# Patient Record
Sex: FEMALE | Race: WHITE | NOT HISPANIC OR LATINO
[De-identification: names, ages, dates, MRNs, and addresses within clinical notes are randomized per-mention and may not be internally consistent; named-entity substitution may affect disease eponyms.]

---

## 2019-01-02 PROBLEM — Z00.00 ENCOUNTER FOR PREVENTIVE HEALTH EXAMINATION: Status: ACTIVE | Noted: 2019-01-02

## 2022-08-15 ENCOUNTER — APPOINTMENT (OUTPATIENT)
Dept: ORTHOPEDIC SURGERY | Facility: CLINIC | Age: 52
End: 2022-08-15

## 2022-08-15 VITALS — HEIGHT: 68 IN | BODY MASS INDEX: 22.73 KG/M2 | WEIGHT: 150 LBS

## 2022-08-15 DIAGNOSIS — Z78.9 OTHER SPECIFIED HEALTH STATUS: ICD-10-CM

## 2022-08-15 DIAGNOSIS — F17.200 NICOTINE DEPENDENCE, UNSPECIFIED, UNCOMPLICATED: ICD-10-CM

## 2022-08-15 PROCEDURE — 99214 OFFICE O/P EST MOD 30 MIN: CPT

## 2022-08-15 RX ORDER — KETOROLAC TROMETHAMINE 10 MG/1
10 TABLET, FILM COATED ORAL
Qty: 10 | Refills: 0 | Status: ACTIVE | COMMUNITY
Start: 2022-04-07

## 2022-08-15 RX ORDER — TOPIRAMATE 100 MG/1
100 TABLET, FILM COATED ORAL
Qty: 30 | Refills: 0 | Status: ACTIVE | COMMUNITY
Start: 2022-08-03

## 2022-08-15 RX ORDER — CLONAZEPAM 1 MG/1
1 TABLET ORAL
Qty: 90 | Refills: 0 | Status: ACTIVE | COMMUNITY
Start: 2022-07-05

## 2022-08-15 RX ORDER — FOLIC ACID 1 MG/1
1 TABLET ORAL
Qty: 30 | Refills: 0 | Status: ACTIVE | COMMUNITY
Start: 2022-02-21

## 2022-08-15 RX ORDER — GABAPENTIN 800 MG/1
800 TABLET, COATED ORAL
Qty: 90 | Refills: 0 | Status: ACTIVE | COMMUNITY
Start: 2022-08-03

## 2022-08-15 RX ORDER — PREGABALIN 100 MG/1
100 CAPSULE ORAL
Qty: 90 | Refills: 0 | Status: ACTIVE | COMMUNITY
Start: 2022-08-03

## 2022-08-15 RX ORDER — AMOXICILLIN AND CLAVULANATE POTASSIUM 875; 125 MG/1; MG/1
875-125 TABLET, COATED ORAL
Qty: 20 | Refills: 0 | Status: ACTIVE | COMMUNITY
Start: 2022-04-19

## 2022-08-15 RX ORDER — CITALOPRAM HYDROBROMIDE 40 MG/1
40 TABLET, FILM COATED ORAL
Qty: 30 | Refills: 0 | Status: ACTIVE | COMMUNITY
Start: 2022-08-03

## 2022-08-15 RX ORDER — MORPHINE SULFATE 15 MG/1
15 TABLET, FILM COATED, EXTENDED RELEASE ORAL
Qty: 60 | Refills: 0 | Status: ACTIVE | COMMUNITY
Start: 2022-07-05

## 2022-08-15 RX ORDER — MONTELUKAST 10 MG/1
10 TABLET, FILM COATED ORAL
Qty: 30 | Refills: 0 | Status: ACTIVE | COMMUNITY
Start: 2022-04-28

## 2022-08-15 RX ORDER — TRAZODONE HYDROCHLORIDE 150 MG/1
150 TABLET ORAL
Qty: 30 | Refills: 0 | Status: ACTIVE | COMMUNITY
Start: 2022-08-03

## 2022-08-15 RX ORDER — DICLOFENAC SODIUM 1% 10 MG/G
1 GEL TOPICAL
Qty: 100 | Refills: 0 | Status: ACTIVE | COMMUNITY
Start: 2022-08-03

## 2022-08-15 RX ORDER — CLINDAMYCIN HYDROCHLORIDE 300 MG/1
300 CAPSULE ORAL
Qty: 40 | Refills: 0 | Status: ACTIVE | COMMUNITY
Start: 2022-03-29

## 2022-08-15 RX ORDER — DICLOFENAC SODIUM 100 MG/1
100 TABLET, FILM COATED, EXTENDED RELEASE ORAL
Qty: 26 | Refills: 0 | Status: ACTIVE | COMMUNITY
Start: 2022-07-07

## 2022-08-15 RX ORDER — DIAZEPAM 5 MG/1
5 TABLET ORAL
Qty: 2 | Refills: 0 | Status: ACTIVE | COMMUNITY
Start: 2022-07-07

## 2022-08-15 RX ORDER — DIAZEPAM 2 MG/1
2 TABLET ORAL
Qty: 2 | Refills: 0 | Status: ACTIVE | COMMUNITY
Start: 2022-03-14

## 2022-08-15 RX ORDER — PREDNISONE 10 MG/1
10 TABLET ORAL
Qty: 20 | Refills: 0 | Status: ACTIVE | COMMUNITY
Start: 2022-04-28

## 2022-08-15 RX ORDER — CARISOPRODOL 350 MG/1
350 TABLET ORAL
Qty: 60 | Refills: 0 | Status: ACTIVE | COMMUNITY
Start: 2022-07-07

## 2022-08-15 RX ORDER — ALBUTEROL SULFATE 90 UG/1
108 (90 BASE) INHALANT RESPIRATORY (INHALATION)
Qty: 7 | Refills: 0 | Status: ACTIVE | COMMUNITY
Start: 2021-08-25

## 2022-08-15 RX ORDER — OXYCODONE 20 MG/1
20 TABLET ORAL
Qty: 90 | Refills: 0 | Status: ACTIVE | COMMUNITY
Start: 2022-08-03

## 2022-08-29 ENCOUNTER — FORM ENCOUNTER (OUTPATIENT)
Age: 52
End: 2022-08-29

## 2022-09-25 ENCOUNTER — FORM ENCOUNTER (OUTPATIENT)
Age: 52
End: 2022-09-25

## 2022-09-29 ENCOUNTER — FORM ENCOUNTER (OUTPATIENT)
Age: 52
End: 2022-09-29

## 2022-09-30 NOTE — ADDENDUM
[FreeTextEntry1] : Patient given rx for LSO brace to be worn s/p lumbar spinal fusion, in order to reduce pain, provide stability and facilitate healing.

## 2022-09-30 NOTE — ASSESSMENT
[FreeTextEntry1] : I had a lengthy discussion with the patient about diagnosis, treatment options and prognosis.\par \par Instrumentation stable L4-S1\par DDD L2-3 with right severe foraminal stenosis \par Scoliosis - apex L2-3\par \par Patient with persistent symptoms despite non operative treatment.

## 2022-09-30 NOTE — DATA REVIEWED
[MRI] : MRI [Lumbar Spine] : lumbar spine [Report was reviewed and noted in the chart] : The report was reviewed and noted in the chart [I independently reviewed and interpreted images and report] : I independently reviewed and interpreted images and report [FreeTextEntry1] : Multilevel lumbar spondylosis \par Instrumentation stable L4-S1\par DDD L2-3 with right severe foraminal stenosis \par Scoliosis - apex L2-3

## 2022-09-30 NOTE — HISTORY OF PRESENT ILLNESS
[Lower back] : lower back [Gradual] : gradual [9] : 9 [Burning] : burning [Sharp] : sharp [Throbbing] : throbbing [Tingling] : tingling [Constant] : constant [Nothing helps with pain getting better] : Nothing helps with pain getting better [Disabled] : Work status: disabled [de-identified] : Patient presents today with lower back pain for years with NKI. Hx of 2 lumbar spine surgeries in 2015. Has been going to pain management, having lumbar ESIs monthly with Dr Valladares. Experiencing pain radiating down right leg with severe numbness/tingling. Taking Morphine 30mg, OxyCodone 20mg, Klonopin 1mg, Soma 350mg. Had lumbar spine MRI at . [] : no [FreeTextEntry7] : down right leg [de-identified] : prolonged sitting and standing [de-identified] : lumbar spine MRI at

## 2022-09-30 NOTE — DISCUSSION/SUMMARY
[Surgical risks reviewed] : Surgical risks reviewed [de-identified] : I discussed non operative and operative treatment options in great detail with the patient. I discussed treatment options, including but not limited to,\par 1. Non operative treatment - rest, NSAID, physical therapy etc.\par 2. Interventional treatment - injections etc.\par 3. Surgical treatment - posterior laminectomy and fusion extension to L2-4, autograft, allograft. \par \par Patient wants to proceed with surgical intervention after failing nonoperative care. I had a lengthy discussion with the patient about the rational and goal of the surgery as well as expected outcome. I encouraged patient to seek a second opinion regarding surgery. I explained postoperative rehabilitation and recovery process to the patient. I discussed risks, benefits and alternatives of the procedure in detail with the patient. I counseled patient about risks and possible complications, including but not limited to, infection, bleeding, nerve injury, arterial and venous injury, single or multiple muscle group weakness, dural tear, CSF leak, pseudomeningocele, arachnoiditis, CSF fistula, meningitis, discitis, osteomyelitis, epidural hematoma, DVT, PE, CRPS, MI, paralysis, pseudoarthrosis, instrumentation malposition, adjacent segment disease, persistent symptoms, and risks of anesthesia. I explained to the patient that the surgical outcome is unpredictable and there is no guarantee that the symptoms will resolve after the surgery. The patient understands and wishes to proceed. All questions were answered and patient was given information to review.\par \par

## 2022-10-05 ENCOUNTER — FORM ENCOUNTER (OUTPATIENT)
Age: 52
End: 2022-10-05

## 2022-10-06 ENCOUNTER — FORM ENCOUNTER (OUTPATIENT)
Age: 52
End: 2022-10-06

## 2022-10-07 ENCOUNTER — TRANSCRIPTION ENCOUNTER (OUTPATIENT)
Age: 52
End: 2022-10-07

## 2022-10-10 ENCOUNTER — FORM ENCOUNTER (OUTPATIENT)
Age: 52
End: 2022-10-10

## 2022-10-18 ENCOUNTER — FORM ENCOUNTER (OUTPATIENT)
Age: 52
End: 2022-10-18

## 2022-10-19 ENCOUNTER — FORM ENCOUNTER (OUTPATIENT)
Age: 52
End: 2022-10-19

## 2022-10-21 ENCOUNTER — APPOINTMENT (OUTPATIENT)
Dept: ORTHOPEDIC SURGERY | Facility: HOSPITAL | Age: 52
End: 2022-10-21

## 2022-10-21 PROCEDURE — 63047 LAM FACETEC & FORAMOT LUMBAR: CPT | Mod: AS

## 2022-10-21 PROCEDURE — 22614 ARTHRD PST TQ 1NTRSPC EA ADD: CPT

## 2022-10-21 PROCEDURE — 63048 LAM FACETEC &FORAMOT EA ADDL: CPT

## 2022-10-21 PROCEDURE — 22842 INSERT SPINE FIXATION DEVICE: CPT | Mod: AS

## 2022-10-21 PROCEDURE — 22614 ARTHRD PST TQ 1NTRSPC EA ADD: CPT | Mod: AS

## 2022-10-21 PROCEDURE — 22612 ARTHRD PST TQ 1NTRSPC LUMBAR: CPT | Mod: AS

## 2022-10-21 PROCEDURE — 22842 INSERT SPINE FIXATION DEVICE: CPT

## 2022-10-21 PROCEDURE — 63048 LAM FACETEC &FORAMOT EA ADDL: CPT | Mod: AS

## 2022-10-21 PROCEDURE — 22612 ARTHRD PST TQ 1NTRSPC LUMBAR: CPT

## 2022-10-21 PROCEDURE — 63047 LAM FACETEC & FORAMOT LUMBAR: CPT

## 2022-10-25 ENCOUNTER — FORM ENCOUNTER (OUTPATIENT)
Age: 52
End: 2022-10-25

## 2022-11-09 ENCOUNTER — FORM ENCOUNTER (OUTPATIENT)
Age: 52
End: 2022-11-09

## 2022-11-10 ENCOUNTER — APPOINTMENT (OUTPATIENT)
Dept: ORTHOPEDIC SURGERY | Facility: CLINIC | Age: 52
End: 2022-11-10

## 2022-11-10 VITALS — HEIGHT: 68 IN | WEIGHT: 150 LBS | BODY MASS INDEX: 22.73 KG/M2

## 2022-11-10 PROCEDURE — 72100 X-RAY EXAM L-S SPINE 2/3 VWS: CPT

## 2022-11-10 PROCEDURE — 99024 POSTOP FOLLOW-UP VISIT: CPT

## 2022-11-10 RX ORDER — MAGNESIUM HYDROXIDE 400 MG/5ML
1200 SUSPENSION, ORAL (FINAL DOSE FORM) ORAL
Qty: 1 | Refills: 0 | Status: ACTIVE | COMMUNITY
Start: 2022-11-10 | End: 1900-01-01

## 2022-11-10 RX ORDER — CEPHALEXIN 500 MG/1
500 TABLET ORAL
Qty: 40 | Refills: 0 | Status: ACTIVE | COMMUNITY
Start: 2022-11-10 | End: 1900-01-01

## 2022-11-10 NOTE — ASSESSMENT
[FreeTextEntry1] : No bending lifting twisting or driving.  Keep using LSO brace when ambulating. \par \par Begin Keflex 500 QID x 10 days \par \par Follow up in 6 weeks

## 2022-11-10 NOTE — HISTORY OF PRESENT ILLNESS
[Lower back] : lower back [Gradual] : gradual [9] : 9 [Burning] : burning [Sharp] : sharp [Throbbing] : throbbing [Tingling] : tingling [Constant] : constant [Nothing helps with pain getting better] : Nothing helps with pain getting better [Disabled] : Work status: disabled [de-identified] : S/P Laminectomy L2,L3,L4 Extension of Fusion L2-4 10/21/22. Denies chills and SOB. Admits to having a fever last night Pain is 7-8/10. Denies taking pain medication today. Using walker for ambulation. [] : no [FreeTextEntry7] : down right leg [de-identified] : prolonged sitting and standing [de-identified] : lumbar spine MRI at

## 2022-12-10 RX ORDER — CEPHALEXIN 500 MG/1
500 CAPSULE ORAL 4 TIMES DAILY
Qty: 40 | Refills: 0 | Status: ACTIVE | COMMUNITY
Start: 2022-12-10 | End: 1900-01-01

## 2022-12-12 ENCOUNTER — APPOINTMENT (OUTPATIENT)
Dept: ORTHOPEDIC SURGERY | Facility: CLINIC | Age: 52
End: 2022-12-12

## 2022-12-12 VITALS — WEIGHT: 150 LBS | BODY MASS INDEX: 22.73 KG/M2 | HEIGHT: 68 IN

## 2022-12-12 PROCEDURE — 72100 X-RAY EXAM L-S SPINE 2/3 VWS: CPT

## 2022-12-12 PROCEDURE — 99024 POSTOP FOLLOW-UP VISIT: CPT

## 2022-12-12 NOTE — ASSESSMENT
[FreeTextEntry1] : No bending lifting twisting or driving.  Keep using LSO brace when ambulating. \par \par Patient currently using clam shell brace, brace is digging into her skin and causing pain.  She is unable to use it.  patient given RX for LSO brace \par \par Clean incision with Betadine daily, change dressing daily.  Continue Keflex 500 QID \par \par Follow up in 1 week

## 2022-12-12 NOTE — HISTORY OF PRESENT ILLNESS
[Lower back] : lower back [Gradual] : gradual [9] : 9 [Burning] : burning [Sharp] : sharp [Throbbing] : throbbing [Tingling] : tingling [Constant] : constant [Nothing helps with pain getting better] : Nothing helps with pain getting better [Disabled] : Work status: disabled [de-identified] : S/P Laminectomy L2,L3,L4 Extension of Fusion L2-4 10/21/22. Incision oozing pus since Saturday. Pain is 9/10. Taking Keflex. Taking Oxycodone. Denies wearing LSO brace. [] : no [FreeTextEntry7] : down right leg [de-identified] : prolonged sitting and standing [de-identified] : lumbar spine MRI at

## 2022-12-22 ENCOUNTER — APPOINTMENT (OUTPATIENT)
Dept: ORTHOPEDIC SURGERY | Facility: CLINIC | Age: 52
End: 2022-12-22
Payer: MEDICARE

## 2022-12-22 VITALS — WEIGHT: 150 LBS | BODY MASS INDEX: 22.73 KG/M2 | HEIGHT: 68 IN

## 2022-12-22 PROCEDURE — 73030 X-RAY EXAM OF SHOULDER: CPT | Mod: FY,LT

## 2022-12-22 PROCEDURE — 20610 DRAIN/INJ JOINT/BURSA W/O US: CPT | Mod: 79,LT

## 2022-12-22 PROCEDURE — 99214 OFFICE O/P EST MOD 30 MIN: CPT | Mod: 24,25

## 2022-12-22 NOTE — HISTORY OF PRESENT ILLNESS
[Lower back] : lower back [Gradual] : gradual [9] : 9 [Burning] : burning [Sharp] : sharp [Throbbing] : throbbing [Tingling] : tingling [Constant] : constant [Nothing helps with pain getting better] : Nothing helps with pain getting better [Disabled] : Work status: disabled [de-identified] : S/P Laminectomy L2,L3,L4 Extension of Fusion L2-4 10/21/22. States she has no signs of infection. Having difficulty sleeping. D/C Keflex due to bilateral foot swelling. Taking Oxycodone.  Patient states she started having left shoulder pain 2 weeks ago.  She is having difficulty lifting her arm up. Denies wearing LSO brace. Using walker for ambulation. [] : no [FreeTextEntry7] : down right leg [de-identified] : prolonged sitting and standing [de-identified] : lumbar spine MRI at

## 2022-12-22 NOTE — ASSESSMENT
[FreeTextEntry1] : No bending lifting twisting or driving.  Keep using LSO brace when ambulating. \par \par Recommend: - rest - ice - compression - elevation \par \par Recommend: - NSAID - Heating pad - Muscle relaxer - Core strengthening exercise - Hamstring stretching exercise Patient is given back rehabilitation exercise book. \par \par Follow up in 2 months

## 2023-01-09 ENCOUNTER — APPOINTMENT (OUTPATIENT)
Dept: ORTHOPEDIC SURGERY | Facility: CLINIC | Age: 53
End: 2023-01-09
Payer: MEDICARE

## 2023-01-09 VITALS — HEIGHT: 68 IN | WEIGHT: 150 LBS | BODY MASS INDEX: 22.73 KG/M2

## 2023-01-09 PROCEDURE — 72100 X-RAY EXAM L-S SPINE 2/3 VWS: CPT

## 2023-01-09 PROCEDURE — 99024 POSTOP FOLLOW-UP VISIT: CPT

## 2023-01-09 NOTE — HISTORY OF PRESENT ILLNESS
[Lower back] : lower back [Gradual] : gradual [9] : 9 [Burning] : burning [Sharp] : sharp [Throbbing] : throbbing [Tingling] : tingling [Constant] : constant [Nothing helps with pain getting better] : Nothing helps with pain getting better [Disabled] : Work status: disabled [de-identified] : S/P Laminectomy L2,L3,L4 Extension of Fusion L2-4 10/21/22. Patient was walking up her steps 12/24/22 and fell on her right side. Went to [facility] 12/26/22, had thoracic spine and right knee xrays. Went to [facility] and fell on her back on 12/30/22. Went to [facility], had right hip and right wrist xrays. States she has a bump on her back. Taking Gabapentin and Tramadol. Taking Oxycodone PRN. Denies wearing LSO brace. Using walker for ambulation. [] : no [FreeTextEntry7] : down right leg [de-identified] : prolonged sitting and standing [de-identified] : lumbar spine MRI at

## 2023-01-09 NOTE — ASSESSMENT
[FreeTextEntry1] : Patient after mechanical fall at [facility], x-ray show soft tissue swelling, no instrumentation failure.  Recommend: - rest - ice - compression - elevation \par \par No bending lifting twisting or driving.  Keep using LSO brace when ambulating. \par \par Recommend: - NSAID - Heating pad - Muscle relaxer - Core strengthening exercise - Hamstring stretching exercise Patient is given back rehabilitation exercise book. \par \par Follow up in 2 months

## 2023-01-09 NOTE — IMAGING
[Implants in position] : Implants in position [No loosening of hardware] : No loosening of hardware [FreeTextEntry1] : soft tissue swelling

## 2023-01-16 ENCOUNTER — APPOINTMENT (OUTPATIENT)
Dept: ORTHOPEDIC SURGERY | Facility: CLINIC | Age: 53
End: 2023-01-16

## 2023-02-16 ENCOUNTER — APPOINTMENT (OUTPATIENT)
Dept: ORTHOPEDIC SURGERY | Facility: CLINIC | Age: 53
End: 2023-02-16
Payer: MEDICARE

## 2023-02-16 VITALS — BODY MASS INDEX: 22.73 KG/M2 | HEIGHT: 68 IN | WEIGHT: 150 LBS

## 2023-02-16 PROCEDURE — 99214 OFFICE O/P EST MOD 30 MIN: CPT | Mod: 25

## 2023-02-16 PROCEDURE — 72100 X-RAY EXAM L-S SPINE 2/3 VWS: CPT

## 2023-02-16 NOTE — ASSESSMENT
[FreeTextEntry1] : Patient given prescription for MRI, follow up after study is completed to discuss results. \par \par Patient will begin physical therapy for lumbar spine \par \par Recommend: - rest - ice - compression - elevation  \par \par Recommend: - NSAID - Heating pad - Muscle relaxer - Core strengthening exercise - Hamstring stretching exercise Patient is given back rehabilitation exercise book. \par

## 2023-02-16 NOTE — HISTORY OF PRESENT ILLNESS
[Lower back] : lower back [Gradual] : gradual [9] : 9 [Burning] : burning [Sharp] : sharp [Throbbing] : throbbing [Tingling] : tingling [Constant] : constant [Nothing helps with pain getting better] : Nothing helps with pain getting better [Disabled] : Work status: disabled [de-identified] : S/P Laminectomy L2,L3,L4 Extension of Fusion L2-4 10/21/22. Experiencing constant right sided pain. Taking Gabapentin and Tramadol. Taking Oxycodone PRN. Wearing LSO brace.  [] : no [FreeTextEntry7] : down right leg [de-identified] : prolonged sitting and standing [de-identified] : lumbar spine MRI at

## 2023-03-08 ENCOUNTER — RESULT REVIEW (OUTPATIENT)
Age: 53
End: 2023-03-08

## 2023-03-13 ENCOUNTER — APPOINTMENT (OUTPATIENT)
Dept: ORTHOPEDIC SURGERY | Facility: CLINIC | Age: 53
End: 2023-03-13
Payer: MEDICARE

## 2023-03-13 VITALS — BODY MASS INDEX: 22.73 KG/M2 | WEIGHT: 150 LBS | HEIGHT: 68 IN

## 2023-03-13 PROCEDURE — 99214 OFFICE O/P EST MOD 30 MIN: CPT | Mod: 25

## 2023-03-13 PROCEDURE — 20610 DRAIN/INJ JOINT/BURSA W/O US: CPT | Mod: LT

## 2023-03-13 NOTE — ASSESSMENT
[FreeTextEntry1] : Left shoulder:\par Calcific tendonitis \par Bursitis \par \par Patient will begin physical therapy. \par \par Recommend: - rest - ice - compression - elevation  \par \par Recommend: - NSAID - Heating pad - Muscle relaxer - Core strengthening exercise - Hamstring stretching exercise Patient is given back rehabilitation exercise book. \par \par Follow up in 2 months

## 2023-03-13 NOTE — HISTORY OF PRESENT ILLNESS
[Lower back] : lower back [Gradual] : gradual [9] : 9 [Burning] : burning [Sharp] : sharp [Throbbing] : throbbing [Tingling] : tingling [Constant] : constant [Nothing helps with pain getting better] : Nothing helps with pain getting better [Disabled] : Work status: disabled [de-identified] : S/P Laminectomy L2,L3,L4 Extension of Fusion L2-4 10/21/22 and Left Shoulder MRI Results at . States her back is doing well. Admits to having severe left shoulder pain. Admits to going to PT 2x a week with some relief. Admits to taking Gabapentin and Tramadol. Admits to taking Oxycodone PRN.  [] : no [FreeTextEntry7] : down right leg [de-identified] : prolonged sitting and standing [de-identified] : lumbar spine MRI at

## 2023-03-13 NOTE — DATA REVIEWED
[MRI] : MRI [Left] : left [Shoulder] : shoulder [Report was reviewed and noted in the chart] : The report was reviewed and noted in the chart [I independently reviewed and interpreted images and report] : I independently reviewed and interpreted images and report [FreeTextEntry1] : Calcific tendonitis \par Bursitis \par

## 2023-05-11 ENCOUNTER — APPOINTMENT (OUTPATIENT)
Dept: ORTHOPEDIC SURGERY | Facility: CLINIC | Age: 53
End: 2023-05-11

## 2023-06-08 ENCOUNTER — APPOINTMENT (OUTPATIENT)
Dept: ORTHOPEDIC SURGERY | Facility: CLINIC | Age: 53
End: 2023-06-08
Payer: MEDICARE

## 2023-06-08 VITALS — WEIGHT: 150 LBS | BODY MASS INDEX: 22.73 KG/M2 | HEIGHT: 68 IN

## 2023-06-08 PROCEDURE — 99214 OFFICE O/P EST MOD 30 MIN: CPT | Mod: 25

## 2023-06-08 PROCEDURE — 20610 DRAIN/INJ JOINT/BURSA W/O US: CPT | Mod: LT

## 2023-06-08 PROCEDURE — 72100 X-RAY EXAM L-S SPINE 2/3 VWS: CPT | Mod: FY

## 2023-06-08 NOTE — HISTORY OF PRESENT ILLNESS
Protocol For Protocol For Photochemotherapy For Severe Photoresponsive Dermatoses: Bath Puva: The patient received Photochemotherapy for severe photoresponsive dermatoses: Bath PUVA requiring at least 4 to 8 hours of care under direct physician supervision. Protocol For Photochemotherapy: Baby Oil And Nbuvb: The patient received Photochemotherapy: Baby Oil and NBUVB (baby oil applied to all lesions prior to phototherapy). [Lower back] : lower back Protocol For Photochemotherapy For Severe Photoresponsive Dermatoses: Petrolatum And Broad Band Uvb: The patient received Photochemotherapyfor severe photoresponsive dermatoses: Petrolatum and Broad Band UVB requiring at least 4 to 8 hours of care under direct physician supervision. [Gradual] : gradual Protocol For Broad Band Uvb: The patient received Broad Band UVB. [9] : 9 Protocol For Puva: The patient received PUVA. [Burning] : burning Eye And Genital Shields: yes [Sharp] : sharp Protocol For Bath Puva: The patient received Bath PUVA. [Throbbing] : throbbing Comments: She has re-pigmentation in multiple areas.  However she still has very large areas of depigmentation on the legs.  The goal of phototherapy is to keep the depigmentation from spreading and to keep the pigment that has returned with phototherapy. [Tingling] : tingling Protocol For Photochemotherapy For Severe Photoresponsive Dermatoses: Petrolatum And Nbuvb: The patient received Photochemotherapy for severe photoresponsive dermatoses: Petrolatum and NBUVB requiring at least 4 to 8 hours of care under direct physician supervision. [Constant] : constant Detail Level: Generalized [Nothing helps with pain getting better] : Nothing helps with pain getting better Treatment Number: 211 [Disabled] : Work status: disabled Price (Use Numbers Only, No Special Characters Or $): 5.00 [de-identified] : Follow up lumbar spine. Reports to have stopped going to PT 2x a week due to increase pain. Admits to taking Gabapentin and Tramadol. Admits to taking Oxycodone PRN.  Protocol For Photochemotherapy: Petrolatum And Nbuvb: The patient received Photochemotherapy: Petrolatum and NBUVB (petrolatum applied to all lesions prior to phototherapy). [] : no Protocol For Photochemotherapy For Severe Photoresponsive Dermatoses: Tar And Broad Band Uvb (Goeckerman Treatment): The patient received Photochemotherapy for severe photoresponsive dermatoses: Tar and Broad Band UVB (Goeckerman treatment) requiring at least 4 to 8 hours of care under direct physician supervision. [FreeTextEntry7] : down right leg Protocol For Photochemotherapy: Tar And Broad Band Uvb (Goeckerman Treatment): The patient received Photochemotherapy: Tar and Broad Band UVB (Goeckerman treatment). [de-identified] : prolonged sitting and standing Post-Care Instructions: I reviewed with the patient in detail post-care instructions. Patient is to wear sun protection. Patients may expect sunburn like redness, discomfort and scabbing. [de-identified] : lumbar spine MRI at  Protocol For Photochemotherapy: Tar And Nbuvb (Goeckerman Treatment): The patient received Photochemotherapy: Tar and NBUVB (Goeckerman treatment). Protocol For Photochemotherapy For Severe Photoresponsive Dermatoses: Tar And Nbuvb (Goeckerman Treatment): The patient received Photochemotherapy for severe photoresponsive dermatoses: Tar and NBUVB (Goeckerman treatment) requiring at least 4 to 8 hours of care under direct physician supervision. Consent: The risks were reviewed with the patient including but not limited to: burn, pigmentary changes, pain, blistering, scabbing, redness, Protocol For Photochemotherapy: Petrolatum And Broad Band Uvb: The patient received Photochemotherapy: Petrolatum and Broad Band UVB. Total Body Time: 1:20 max Protocol For Photochemotherapy For Severe Photoresponsive Dermatoses: Puva: The patient received Photochemotherapy for severe photoresponsive dermatoses: PUVA requiring at least 4 to 8 hours of care under direct physician supervision. Protocol: Photochemotherapy: Petrolatum and NBUVB Protocol For Nbuvb: The patient received NBUVB.

## 2023-06-08 NOTE — REASON FOR VISIT
[FreeTextEntry2] : Lower back pain for years with NKI. Hx of 2 lumbar surgeries in 2015\par Laminectomy L2,L3,L4 Extension of Fusion L2-4 10/21/22

## 2023-06-08 NOTE — ASSESSMENT
[FreeTextEntry1] : Left shoulder:\par Calcific tendonitis \par Bursitis \par \par Continue HEP\par \par Referral to pain management for injections, follow up 2 weeks after injection - Right SI injection \par \par Recommend: - rest - ice - compression - elevation  \par \par Recommend: - NSAID - Heating pad - Muscle relaxer - Core strengthening exercise - Hamstring stretching exercise Patient is given back rehabilitation exercise book. \par \par Follow up in 2 months

## 2023-08-10 ENCOUNTER — APPOINTMENT (OUTPATIENT)
Dept: ORTHOPEDIC SURGERY | Facility: CLINIC | Age: 53
End: 2023-08-10

## 2023-11-27 ENCOUNTER — APPOINTMENT (OUTPATIENT)
Dept: ORTHOPEDIC SURGERY | Facility: CLINIC | Age: 53
End: 2023-11-27
Payer: MEDICARE

## 2023-11-27 PROCEDURE — 20610 DRAIN/INJ JOINT/BURSA W/O US: CPT | Mod: LT

## 2023-11-27 PROCEDURE — 99214 OFFICE O/P EST MOD 30 MIN: CPT | Mod: 25

## 2024-01-08 ENCOUNTER — RESULT REVIEW (OUTPATIENT)
Age: 54
End: 2024-01-08

## 2024-01-26 ENCOUNTER — APPOINTMENT (OUTPATIENT)
Dept: ORTHOPEDIC SURGERY | Facility: CLINIC | Age: 54
End: 2024-01-26
Payer: MEDICARE

## 2024-01-26 DIAGNOSIS — M75.42 IMPINGEMENT SYNDROME OF LEFT SHOULDER: ICD-10-CM

## 2024-01-26 DIAGNOSIS — M53.3 SACROCOCCYGEAL DISORDERS, NOT ELSEWHERE CLASSIFIED: ICD-10-CM

## 2024-01-26 DIAGNOSIS — M54.16 RADICULOPATHY, LUMBAR REGION: ICD-10-CM

## 2024-01-26 DIAGNOSIS — M75.102 UNSPECIFIED ROTATOR CUFF TEAR OR RUPTURE OF LEFT SHOULDER, NOT SPECIFIED AS TRAUMATIC: ICD-10-CM

## 2024-01-26 DIAGNOSIS — M48.061 SPINAL STENOSIS, LUMBAR REGION WITHOUT NEUROGENIC CLAUDICATION: ICD-10-CM

## 2024-01-26 DIAGNOSIS — Z98.1 ARTHRODESIS STATUS: ICD-10-CM

## 2024-01-26 DIAGNOSIS — M75.52 BURSITIS OF LEFT SHOULDER: ICD-10-CM

## 2024-01-26 DIAGNOSIS — M41.56 OTHER SECONDARY SCOLIOSIS, LUMBAR REGION: ICD-10-CM

## 2024-01-26 DIAGNOSIS — M51.36 OTHER INTERVERTEBRAL DISC DEGENERATION, LUMBAR REGION: ICD-10-CM

## 2024-01-26 DIAGNOSIS — M51.37 OTHER INTERVERTEBRAL DISC DEGENERATION, LUMBOSACRAL REGION: ICD-10-CM

## 2024-01-26 DIAGNOSIS — M75.32 CALCIFIC TENDINITIS OF LEFT SHOULDER: ICD-10-CM

## 2024-01-26 DIAGNOSIS — M47.817 SPONDYLOSIS W/OUT MYELOPATHY OR RADICULOPATHY, LUMBOSACRAL REGION: ICD-10-CM

## 2024-01-26 DIAGNOSIS — M12.812 UNSPECIFIED ROTATOR CUFF TEAR OR RUPTURE OF LEFT SHOULDER, NOT SPECIFIED AS TRAUMATIC: ICD-10-CM

## 2024-01-26 PROCEDURE — 99214 OFFICE O/P EST MOD 30 MIN: CPT

## 2024-01-28 NOTE — DISCUSSION/SUMMARY
[de-identified] : I discussed non operative and operative treatment options in great detail with the patient. I discussed treatment options, including but not limited to, 1. Non operative treatment - rest, NSAID, physical therapy etc. 2. Interventional treatment - injections etc. 3. Surgical treatment - Left shoulder arthroscopy, subacromial decompression, distal clavicle resection, debridement rotator cuff  - Patient with persistent symptoms refractory to non-operative care. Patient is seeking surgical options. Patient is a candidate for surgery after failing extensive non operative care.  Patient wants to proceed with surgical intervention after failing nonoperative care. I had a lengthy discussion with the patient about the rational and goal of the surgery as well as expected outcome. I encouraged patient to seek a second opinion regarding surgery. I explained postoperative rehabilitation and recovery process to the patient. I discussed risks, benefits and alternatives of the procedure in detail with the patient. I counseled patient about risks and possible complications, including but not limited to, infection, bleeding, nerve injury, arterial and venous injury, single or multiple muscle group weakness, hematoma, DVT, PE, CRPS, MI, recurrent rotator cuff tear, recurrent bursitis, frozen shoulder, septic joint, arthrofibrosis, persistent symptoms, and risks of anesthesia. I explained to the patient that the surgical outcome is unpredictable and there is no guarantee that the symptoms will resolve after the surgery. The patient understands and wishes to proceed. All questions were answered and patient was given information to review. 
17-May-2021 23:42

## 2024-01-28 NOTE — DATA REVIEWED
[MRI] : MRI [Lumbar Spine] : lumbar spine [I independently reviewed and interpreted images and report] : I independently reviewed and interpreted images and report [FreeTextEntry1] : 1/2024 MRI of L-Spine was reviewed today and is as follows:  Fusion L2-S1 No significant nerve compression

## 2024-01-28 NOTE — ASSESSMENT
[FreeTextEntry1] :   3/2023 MRI of L shoulder was reviewed today and is as follows:  Calcific tendonitis Bursitis Partial thickness rotator cuff tear  1/2024 MRI of L-Spine was reviewed today and is as follows:  Fusion L2-S1 No significant nerve compression  52 yo female presents today for follow up on her lumbar spine and left shoulder. Patient states that she has had SI joint injection x2 with little relief. MRI detailed above shows minimal nerve compression. Pain still localized to right SI joint. Recommend proceeding with another injection.   Patient with persistent left shoulder pain despite multiple CSI and PT. Recommend proceeding with arthroscopy at this time.   - Referral to pain management for right SI joint CSI, follow up 2 weeks after injection.   - Recommend NSAIDs PRN - Recommend heating pad use to decrease muscle spasm - Discussed the importance of home exercises, including but not limited to hamstring stretching and core strengthening   Patient was educated on their diagnosis today. All questions answered and patient expressed understanding.  Follow up in 2 months

## 2024-01-28 NOTE — HISTORY OF PRESENT ILLNESS
[de-identified] : Follow up lumbar spine MRI, study completed at  Patient had SI injections with Dr. Walker in December and is due for another injection next month.  Reports taking Tramadol, Gabapentin, and Oxycodone prn with some relief.  Patient states the pain has gotten worse Patient had a steroid injection last visit in the shoulder and it did not help, would like to discuss sx.  Today's pain shoulder: 9/10 Today's pain back: 9/10

## 2024-05-03 ENCOUNTER — APPOINTMENT (OUTPATIENT)
Dept: ORTHOPEDIC SURGERY | Facility: HOSPITAL | Age: 54
End: 2024-05-03

## 2024-05-16 ENCOUNTER — APPOINTMENT (OUTPATIENT)
Dept: ORTHOPEDIC SURGERY | Facility: CLINIC | Age: 54
End: 2024-05-16